# Patient Record
Sex: MALE | Race: WHITE | NOT HISPANIC OR LATINO | Employment: FULL TIME | URBAN - METROPOLITAN AREA
[De-identification: names, ages, dates, MRNs, and addresses within clinical notes are randomized per-mention and may not be internally consistent; named-entity substitution may affect disease eponyms.]

---

## 2018-01-02 ENCOUNTER — OFFICE VISIT (OUTPATIENT)
Dept: UROLOGY | Facility: CLINIC | Age: 43
End: 2018-01-02
Payer: COMMERCIAL

## 2018-01-02 VITALS
WEIGHT: 220 LBS | DIASTOLIC BLOOD PRESSURE: 97 MMHG | HEART RATE: 89 BPM | BODY MASS INDEX: 29.16 KG/M2 | SYSTOLIC BLOOD PRESSURE: 159 MMHG | HEIGHT: 73 IN

## 2018-01-02 DIAGNOSIS — Z30.09 VASECTOMY EVALUATION: ICD-10-CM

## 2018-01-02 PROCEDURE — 99999 PR PBB SHADOW E&M-EST. PATIENT-LVL III: CPT | Mod: PBBFAC,,, | Performed by: UROLOGY

## 2018-01-02 PROCEDURE — 99203 OFFICE O/P NEW LOW 30 MIN: CPT | Mod: S$GLB,,, | Performed by: UROLOGY

## 2018-01-02 RX ORDER — CIPROFLOXACIN 500 MG/1
500 TABLET ORAL 2 TIMES DAILY
Qty: 6 TABLET | Refills: 0 | Status: SHIPPED | OUTPATIENT
Start: 2018-01-02 | End: 2018-01-05

## 2018-01-02 RX ORDER — DIAZEPAM 5 MG/1
5 TABLET ORAL
Qty: 3 TABLET | Refills: 0 | Status: SHIPPED | OUTPATIENT
Start: 2018-01-02 | End: 2018-08-20 | Stop reason: ALTCHOICE

## 2018-01-02 RX ORDER — HYDROCODONE BITARTRATE AND ACETAMINOPHEN 5; 325 MG/1; MG/1
1 TABLET ORAL EVERY 6 HOURS PRN
Qty: 10 TABLET | Refills: 0 | Status: SHIPPED | OUTPATIENT
Start: 2018-01-02 | End: 2018-08-20 | Stop reason: ALTCHOICE

## 2018-01-02 RX ORDER — LIDOCAINE HYDROCHLORIDE 20 MG/ML
10 INJECTION, SOLUTION INFILTRATION; PERINEURAL ONCE
Status: CANCELLED | OUTPATIENT
Start: 2018-01-02 | End: 2018-01-02

## 2018-01-02 NOTE — PROGRESS NOTES
CC: vasectomy evaluation    Dada Rosales is a 42 y.o. man who is here for the evaluation of Sterilization  a new pt referred by his PCP.  A father of 4 children.  2 children from his second marriage.  The youngest one is 6 years old.  His wife is 34 years old.  He works at the [employer].  No voiding problems.  No family hx of prostate cancer.  Denies flank pain, dysuira, hematuria .      Past Medical History:   Diagnosis Date    Hyperlipidemia      Past Surgical History:   Procedure Laterality Date    FRACTURE SURGERY      nose      Social History   Substance Use Topics    Smoking status: Never Smoker    Smokeless tobacco: Not on file    Alcohol use No     History reviewed. No pertinent family history.  Allergy:  Review of patient's allergies indicates:  No Known Allergies  Outpatient Encounter Prescriptions as of 1/2/2018   Medication Sig Dispense Refill    ciprofloxacin HCl (CIPRO) 500 MG tablet Take 1 tablet (500 mg total) by mouth 2 (two) times daily. Take the first dose 2 hours before the procedure 6 tablet 0    diazePAM (VALIUM) 5 MG tablet Take 1 tablet (5 mg total) by mouth as needed for Anxiety. Take all 3 pills 1 hour before the procedure 3 tablet 0    fenofibrate (TRICOR) 54 MG tablet Take 1 tablet (54 mg total) by mouth once daily. 90 tablet 0    hydrocodone-acetaminophen 5-325mg (NORCO) 5-325 mg per tablet Take 1 tablet by mouth every 6 (six) hours as needed for Pain. 10 tablet 0    simvastatin (ZOCOR) 10 MG tablet Take 1 tablet (10 mg total) by mouth every evening. 90 tablet 0     No facility-administered encounter medications on file as of 1/2/2018.      Review of Systems   General ROS: GENERAL:  No weight gain or loss  SKIN:  No rashes or lacerations  HEAD:  No headaches  EYES:  No exophthalmos, jaundice or blindness  EARS:  No dizziness, tinnitus or hearing loss  NOSE:  No changes in smell  MOUTH & THROAT:  No dyskinetic movements or obvious goiter  CHEST:  No shortness of  breath, hyperventilation or cough  CARDIOVASCULAR:  No tachycardia or chest pain  ABDOMEN:  No nausea, vomiting, pain, constipation or diarrhea  URINARY:  No frequency, dysuria or sexual dysfunction  ENDOCRINE:  No polydipsia, polyuria  MUSCULOSKELETAL:  No pain or stiffness of the joints  NEUROLOGIC:  No weakness, sensory changes, seizures, confusion, memory loss, tremor or other abnormal movements  Physical Exam     Vitals:    01/02/18 1409   BP: (!) 159/97   Pulse: 89     Physical Exam  Genitalia:  Scrotum: no rash or lesion  Normal symmetric epididymis without masses  Normal vas palpated  Normal size, symmetric testicles with no masses   Normal urethral meatus with no discharge  Normal circumcised penis with no lesion   Rectal:  Normal perineum and anus upon inspection.  Normal tone, no masses or tenderness;     LABS:  No results found for: PSA, PSADIAG, PSATOTAL, PSAFREE, PSAFREEPCT  No results found for this or any previous visit.  Lab Results   Component Value Date    CREATININE 1.08 08/31/2015     Assessment and Plan:  Dada Hoffmann was seen today for sterilization.    Diagnoses and all orders for this visit:    Vasectomy evaluation  -     Vasectomy; Future    Other orders  -     diazePAM (VALIUM) 5 MG tablet; Take 1 tablet (5 mg total) by mouth as needed for Anxiety. Take all 3 pills 1 hour before the procedure  -     ciprofloxacin HCl (CIPRO) 500 MG tablet; Take 1 tablet (500 mg total) by mouth 2 (two) times daily. Take the first dose 2 hours before the procedure  -     hydrocodone-acetaminophen 5-325mg (NORCO) 5-325 mg per tablet; Take 1 tablet by mouth every 6 (six) hours as needed for Pain.  -     lidocaine HCL 20 mg/ml (2%) injection 10 mL; 10 mLs by Infiltration route once.    The risks and benefits of vasectomy were discussed with the patient in detail.    The risks include, but are not limited to, bleeding, infection, pain, scarring, chronic pain, sperm granuloma, recannulization and loss of testicular  function.    The patient was informed that the long term effects of vasectomy are unknown and could be associated with a higher risk of prostate cancer.     1.  Stop Aspirin or any blood thinners 1 week before the procedure.  2.  Take a shower with antibiotic soap.  3.  Shave as directed.  4.  Bring your scrotal support.  5.  Need a ride back and forth for the procedure.  6.  Take the antibiotics and valium as directed.    Follow-up:  Return for vasectomy.

## 2018-02-08 ENCOUNTER — HOSPITAL ENCOUNTER (OUTPATIENT)
Dept: UROLOGY | Facility: HOSPITAL | Age: 43
Discharge: HOME | End: 2018-02-08
Attending: UROLOGY
Payer: COMMERCIAL

## 2018-02-08 VITALS
HEIGHT: 73 IN | RESPIRATION RATE: 18 BRPM | WEIGHT: 215.19 LBS | SYSTOLIC BLOOD PRESSURE: 138 MMHG | DIASTOLIC BLOOD PRESSURE: 74 MMHG | HEART RATE: 68 BPM | BODY MASS INDEX: 28.52 KG/M2 | TEMPERATURE: 98 F

## 2018-02-08 DIAGNOSIS — Z30.09 VASECTOMY EVALUATION: ICD-10-CM

## 2018-02-08 PROCEDURE — 27200994 HC ELECTROCAUTERY DEVICE

## 2018-02-08 PROCEDURE — 55250 REMOVAL OF SPERM DUCT(S): CPT | Mod: ,,, | Performed by: UROLOGY

## 2018-02-08 PROCEDURE — 55250 REMOVAL OF SPERM DUCT(S): CPT

## 2018-02-08 RX ORDER — LIDOCAINE HYDROCHLORIDE 20 MG/ML
10 INJECTION, SOLUTION INFILTRATION; PERINEURAL ONCE
Status: COMPLETED | OUTPATIENT
Start: 2018-02-08 | End: 2018-02-08

## 2018-02-08 RX ADMIN — LIDOCAINE HYDROCHLORIDE 10 ML: 20 INJECTION, SOLUTION INFILTRATION; PERINEURAL at 11:02

## 2018-02-08 NOTE — PROCEDURES
Procedure Date:  02/08/2018    Procedure:                                                                        Bilateral Vasectomy                                                                                              Pre-OP Diagnosis:                                                                 Patient requests sterilization                                          Post-OP Diagnosis:                                                                Patient requests sterilization                                          Anesthesia:                                                                       Anesthesia Administered:                                                     Local anesthetic infiltration, using 6 mL of 2% lidocaine                    (Xylocaine) without epinephrine injected into the skin /                     subcutaneous tissues (administered by surgeon).                         Findings:                                                                         Vas identified with ease bilaterally.                                   Description of Procedure:                                                         Informed Consent:                                                            - Risks, benefits and alternatives of procedure discussed with               patient and informed consent obtained.                                       Patient Position:                                                            - Supine.                                                                    Prep and Drape:                                                              - Patient prepped and draped in usual sterile fashion using povidone         iodine (Betadine).                                                           Incision(s):                                                                 - Bilateral scrotal.                                                         Instruments:                                                                  - Ring clamp and sharp mosquito.                                             Procedure Details:                                                           - Vas Ends Occluded with: Clips.                                             - Methods of Vas Occlusion: Clips.                                           - Length of Vas Excised: 1.5 cm.                                             Closure:                                                                     - Subcutaneous Tissue: 3-0 Chromic.                                          - Skin: 3-0 Chromic interrupted.                                             Wound Dressing:                                                              - Wound Dressed with: 4 x 4 gauze and scrotal supporter.                     Sponge / Instrument / Needle Counts:                                         - Final counts correct.                                                      Estimated Blood Loss:                                                        - Minimal.                                                                   Pathology Specimen:                                                          - The specimen sent.                                                    Complications:                                                                    No immediate complications.                                             Post-OP Plan:                                                                     Patient was discharged home in a stable condition.         Medications: abx, pain medication given before.         Follow up: SA in 1 month.                                                               Post Vasectomy instructions given.

## 2018-02-08 NOTE — PATIENT INSTRUCTIONS
What to Expect After a Vasectomy  You cannot drive or operate heavy machinery on the day of the procedure.    Apply ice packs to the scrotal area for 24-48 hours. Avoid direct contact of the ice pack with the skin. Scrotal supports, jock straps, or fitted underwear help elevate the scrotum and reduce discomfort.    You may shower the next day. Gently apply soapy water to the scrotum to wash. Rinse and dry yourself by blotting the skin, not rubbing.    Avoid strenuous physical exercises or sexual relations for at least one week after a vasectomy.    Continue to use birth control for at least 6 weeks or 10-20 ejaculations. You are still considered fertile until your urologist examines a post-vasectomy semen analysis at 6 weeks and perhaps one at 8 weeks as well. Drop off the specimen at the 4th floor [facility] between 8am and 4pm.    Do NOT resume unprotected sexual activity until your physician finds no sperm in your semen.    All stitches will dissolve on their own in 1-2 weeks.    Signs and Symptoms to Report  · A large amount of bleeding at the site  · An unusual amount of pain  · A large amount of swelling in the scrotum  · Fever and chills  · Any signs of infection, such as redness at the site or foul-smelling drainage    Risks  The risks of complication after vasectomy are very low. A few of the risks include:  · Bleeding  · Infection  · Scrotal hematoma - a collection of blood in the scrotum  · Inflammation of the epididymis - inflammation of a structure next to the testicle that helps in maturation of sperm  · Sperm granuloma - a collection of sperm that leaks out from the vas deferens, forming a small nodule or lump. This does not usually cause any discomfort but you may feel it in the scrotum.  · Recanalization - the restoration of the lumen or transport tube between the two ends of the vas deferens, possibly causing fertility    If you have any questions or concerns, please call your [facility]  urologist at [phone].

## 2018-02-08 NOTE — H&P
CC: vasectomy evaluation     Dada Rosales is a 42 y.o. man who is here for the evaluation of Sterilization  a new pt referred by his PCP.  A father of 4 children.  2 children from his second marriage.  The youngest one is 6 years old.  His wife is 34 years old.  He works at the [employer].  No voiding problems.  No family hx of prostate cancer.  Denies flank pain, dysuira, hematuria .            Past Medical History:   Diagnosis Date    Hyperlipidemia              Past Surgical History:   Procedure Laterality Date    FRACTURE SURGERY         nose            Social History   Substance Use Topics    Smoking status: Never Smoker    Smokeless tobacco: Not on file    Alcohol use No      History reviewed. No pertinent family history.  Allergy:  Review of patient's allergies indicates:  No Known Allergies  Encounter Medications   Outpatient Encounter Prescriptions as of 1/2/2018   Medication Sig Dispense Refill    ciprofloxacin HCl (CIPRO) 500 MG tablet Take 1 tablet (500 mg total) by mouth 2 (two) times daily. Take the first dose 2 hours before the procedure 6 tablet 0    diazePAM (VALIUM) 5 MG tablet Take 1 tablet (5 mg total) by mouth as needed for Anxiety. Take all 3 pills 1 hour before the procedure 3 tablet 0    fenofibrate (TRICOR) 54 MG tablet Take 1 tablet (54 mg total) by mouth once daily. 90 tablet 0    hydrocodone-acetaminophen 5-325mg (NORCO) 5-325 mg per tablet Take 1 tablet by mouth every 6 (six) hours as needed for Pain. 10 tablet 0    simvastatin (ZOCOR) 10 MG tablet Take 1 tablet (10 mg total) by mouth every evening. 90 tablet 0      No facility-administered encounter medications on file as of 1/2/2018.          Review of Systems   General ROS: GENERAL:  No weight gain or loss  SKIN:  No rashes or lacerations  HEAD:  No headaches  EYES:  No exophthalmos, jaundice or blindness  EARS:  No dizziness, tinnitus or hearing loss  NOSE:  No changes in smell  MOUTH & THROAT:  No dyskinetic  movements or obvious goiter  CHEST:  No shortness of breath, hyperventilation or cough  CARDIOVASCULAR:  No tachycardia or chest pain  ABDOMEN:  No nausea, vomiting, pain, constipation or diarrhea  URINARY:  No frequency, dysuria or sexual dysfunction  ENDOCRINE:  No polydipsia, polyuria  MUSCULOSKELETAL:  No pain or stiffness of the joints  NEUROLOGIC:  No weakness, sensory changes, seizures, confusion, memory loss, tremor or other abnormal movements  Physical Exam         Vitals:     01/02/18 1409   BP: (!) 159/97   Pulse: 89      Physical Exam  Genitalia:  Scrotum: no rash or lesion  Normal symmetric epididymis without masses  Normal vas palpated  Normal size, symmetric testicles with no masses   Normal urethral meatus with no discharge  Normal circumcised penis with no lesion   Rectal:  Normal perineum and anus upon inspection.  Normal tone, no masses or tenderness;      LABS:  No results found for: PSA, PSADIAG, PSATOTAL, PSAFREE, PSAFREEPCT  No results found for this or any previous visit.        Lab Results   Component Value Date     CREATININE 1.08 08/31/2015      Assessment and Plan:  Dada Hoffmann was seen today for sterilization.     Diagnoses and all orders for this visit:     Vasectomy evaluation  -     Vasectomy; Future     Other orders  -     diazePAM (VALIUM) 5 MG tablet; Take 1 tablet (5 mg total) by mouth as needed for Anxiety. Take all 3 pills 1 hour before the procedure  -     ciprofloxacin HCl (CIPRO) 500 MG tablet; Take 1 tablet (500 mg total) by mouth 2 (two) times daily. Take the first dose 2 hours before the procedure  -     hydrocodone-acetaminophen 5-325mg (NORCO) 5-325 mg per tablet; Take 1 tablet by mouth every 6 (six) hours as needed for Pain.  -     lidocaine HCL 20 mg/ml (2%) injection 10 mL; 10 mLs by Infiltration route once.     The risks and benefits of vasectomy were discussed with the patient in detail.    The risks include, but are not limited to, bleeding, infection, pain, scarring,  chronic pain, sperm granuloma, recannulization and loss of testicular function.    The patient was informed that the long term effects of vasectomy are unknown and could be associated with a higher risk of prostate cancer.      1.  Stop Aspirin or any blood thinners 1 week before the procedure.  2.  Take a shower with antibiotic soap.  3.  Shave as directed.  4.  Bring your scrotal support.  5.  Need a ride back and forth for the procedure.  6.  Take the antibiotics and valium as directed.     Follow-up:  Return for vasectomy.

## 2018-03-09 ENCOUNTER — TELEPHONE (OUTPATIENT)
Dept: UROLOGY | Facility: CLINIC | Age: 43
End: 2018-03-09

## 2018-03-09 NOTE — TELEPHONE ENCOUNTER
Notified pt regarding semen specimen dropped of to clinic. Multiple dead sperm seen in specimen per Afsaneh Lynn NP. Pt instructed to have multiple ejaculations before dropping off second sample in two weeks and to continue alternate birth control methods until second sample cleared. Verbalized understanding

## 2018-04-20 ENCOUNTER — TELEPHONE (OUTPATIENT)
Dept: UROLOGY | Facility: CLINIC | Age: 43
End: 2018-04-20

## 2018-04-20 NOTE — TELEPHONE ENCOUNTER
Informed that only 2 dead sperm noted after viewing multiple HP microscopic fields.  Needs to continue alternate BC until analysis noted to be clear.  We discussed only way to clear is to have multiple ejaculations.  Bring sample in 2 weeks.

## 2018-08-20 ENCOUNTER — OFFICE VISIT (OUTPATIENT)
Dept: PRIMARY CARE CLINIC | Facility: CLINIC | Age: 43
End: 2018-08-20
Payer: COMMERCIAL

## 2018-08-20 VITALS
WEIGHT: 211 LBS | BODY MASS INDEX: 27.96 KG/M2 | OXYGEN SATURATION: 98 % | RESPIRATION RATE: 18 BRPM | HEART RATE: 81 BPM | DIASTOLIC BLOOD PRESSURE: 78 MMHG | HEIGHT: 73 IN | SYSTOLIC BLOOD PRESSURE: 131 MMHG | TEMPERATURE: 98 F

## 2018-08-20 DIAGNOSIS — N52.9 ERECTILE DYSFUNCTION, UNSPECIFIED ERECTILE DYSFUNCTION TYPE: ICD-10-CM

## 2018-08-20 DIAGNOSIS — K42.9 UMBILICAL HERNIA WITHOUT OBSTRUCTION AND WITHOUT GANGRENE: Primary | ICD-10-CM

## 2018-08-20 DIAGNOSIS — E78.5 HYPERLIPIDEMIA, UNSPECIFIED HYPERLIPIDEMIA TYPE: ICD-10-CM

## 2018-08-20 PROCEDURE — 3008F BODY MASS INDEX DOCD: CPT | Mod: CPTII,S$GLB,, | Performed by: FAMILY MEDICINE

## 2018-08-20 PROCEDURE — 99213 OFFICE O/P EST LOW 20 MIN: CPT | Mod: S$GLB,,, | Performed by: FAMILY MEDICINE

## 2018-08-20 PROCEDURE — 99999 PR PBB SHADOW E&M-EST. PATIENT-LVL III: CPT | Mod: PBBFAC,,, | Performed by: FAMILY MEDICINE

## 2018-08-20 NOTE — PROGRESS NOTES
Subjective:       Patient ID: Dada Rosales is a 43 y.o. male.    Chief Complaint: Umbilical Hernia    HPI:  43-year-old white male in for umbilical hernia--has had about 1/2 to 2 years--has been gradually increasing in size.  Does increase in size with straining, lifting, and [place].        Patient wife also suggest the patient get a colonoscopy.    ROS:  Skin: no psoriasis, eczema, skin cancer  HEENT: No headache, ocular pain, blurred vision, diplopia, epistaxis, hoarseness change in voice, thyroid trouble  Lung: No pneumonia, asthma, Tb, wheezing, SOB, no smoking   Heart: No chest pain, ankle edema, palpitations, MI, derrick murmur, hypertension,+ hyperlipidemia  Abdomen: No nausea, vomiting, diarrhea, constipation, ulcers, hepatitis, gallbladder disease, melena, hematochezia, hematemesis +see umbilical hernia history of present illness  : no UTI, renal disease, stones  MS: no fractures, O/A, lupus, rheumatoid, gout  Neuro: No dizziness, LOC, seizures   No diabetes, no anemia, no anxiety, no depression   , 4 children, worked  at [employer], lives with wife and 2 children    Objective:   Physical Exam:  General: Well nourished, well developed, no acute distress  Skin: No lesions  HEENT: Eyes PERRLA, EOM intact, nose patent, throat non-erythematous   NECK: Supple, no bruits, No JVD, no nodes  Lungs: Clear, no rales, rhonchi, wheezing  Heart: Regular rate and rhythm, no murmurs, gallops, or rubs  Abdomen: flat, bowel sounds positive, no tenderness, or organomegaly  MS: Range of motion and muscle strength intact  Neuro: Alert, CN intact, oriented X 3  Extremities: No cyanosis, clubbing, or edema         Assessment:       1. Umbilical hernia without obstruction and without gangrene    2. Erectile dysfunction, unspecified erectile dysfunction type    3. Hyperlipidemia, unspecified hyperlipidemia type        Plan:       Umbilical hernia without  obstruction and without gangrene    Erectile dysfunction, unspecified erectile dysfunction type    Hyperlipidemia, unspecified hyperlipidemia type      patient needs routine physical CBCs CMP lipids UA chest x-ray EKG is physical  See Dr. ricardo patient wants colonoscopy an umbilical hernia repair  Exercise-decrease weight  I agree 100 half p.o. q.h.s.

## 2018-09-25 PROBLEM — Z12.11 COLON CANCER SCREENING: Status: ACTIVE | Noted: 2018-09-25

## 2020-03-25 ENCOUNTER — OFFICE VISIT (OUTPATIENT)
Dept: PRIMARY CARE CLINIC | Facility: CLINIC | Age: 45
End: 2020-03-25
Payer: COMMERCIAL

## 2020-03-25 ENCOUNTER — TELEPHONE (OUTPATIENT)
Dept: PRIMARY CARE CLINIC | Facility: CLINIC | Age: 45
End: 2020-03-25

## 2020-03-25 DIAGNOSIS — B02.9 HERPES ZOSTER WITHOUT COMPLICATION: ICD-10-CM

## 2020-03-25 PROCEDURE — 99213 PR OFFICE/OUTPT VISIT, EST, LEVL III, 20-29 MIN: ICD-10-PCS | Mod: 95,,, | Performed by: FAMILY MEDICINE

## 2020-03-25 PROCEDURE — 99213 OFFICE O/P EST LOW 20 MIN: CPT | Mod: 95,,, | Performed by: FAMILY MEDICINE

## 2020-03-25 RX ORDER — VALACYCLOVIR HYDROCHLORIDE 1 G/1
TABLET, FILM COATED ORAL
Qty: 21 TABLET | Refills: 0 | Status: SHIPPED | OUTPATIENT
Start: 2020-03-25

## 2020-03-25 RX ORDER — PREDNISONE 10 MG/1
TABLET ORAL
Qty: 20 TABLET | Refills: 0 | Status: SHIPPED | OUTPATIENT
Start: 2020-03-25

## 2020-03-25 RX ORDER — BETAMETHASONE VALERATE 1.2 MG/G
CREAM TOPICAL 2 TIMES DAILY
Qty: 60 G | Refills: 2 | Status: SHIPPED | OUTPATIENT
Start: 2020-03-25

## 2020-03-25 NOTE — PROGRESS NOTES
Subjective:       Patient ID: Dada Rosales is a 44 y.o. male.    Chief Complaint: No chief complaint on file.    HPI: The patient location is:  home  The chief complaint leading to consultation is:  Skin lesion axillary area   Visit type: Virtual visit with synchronous audio and video  Total time spent with patient:  15 min  Each patient to whom he or she provides medical services by telemedicine is:  (1) informed of the relationship between the physician and patient and the respective role of any other health care provider with respect to management of the patient; and (2) notified that he or she may decline to receive medical services by telemedicine and may withdraw from such care at any time.    Notes:  History of present illness 43-year-old white male ---patient complaining of a rash----underneath the left armpit----states that it feels like a brush burn if raise is arm overhead---patient thinks had chickenpox before.  No history of herpes zoster no burning sensation    ROS:  Skin: no psoriasis, eczema, skin cancer --see history of present illness  HEENT: No headache, ocular pain, blurred vision, diplopia, epistaxis, hoarseness change in voice, thyroid trouble  Lung: No pneumonia, asthma, Tb, wheezing, SOB, no smoking   Heart: No chest pain, ankle edema, palpitations, MI, derrick murmur, hypertension,+ hyperlipidemia  Abdomen: No nausea, vomiting, diarrhea, constipation, ulcers, hepatitis, gallbladder disease, melena, hematochezia, hematemesis +see umbilical hernia history of present illness  : no UTI, renal disease, stones  MS: no fractures, O/A, lupus, rheumatoid, gout  Neuro: No dizziness, LOC, seizures   No diabetes, no anemia, no anxiety, no depression   , 4 children, worked  at [employer], lives with wife and 2 children    Objective:   Physical Exam:  General: Well nourished, well developed, no acute distress  Skin: No lesions  HEENT: Eyes PERRLA, EOM intact,  nose patent, throat non-erythematous   NECK: Supple, no bruits, No JVD, no nodes  Lungs: Clear, no rales, rhonchi, wheezing  Heart: Regular rate and rhythm, no murmurs, gallops, or rubs  Abdomen: flat, bowel sounds positive, no tenderness, or organomegaly  MS: Range of motion and muscle strength intact  Neuro: Alert, CN intact, oriented X 3  Extremities: No cyanosis, clubbing, or edema         Assessment:       1. Herpes zoster without complication        Plan:       Herpes zoster without complication  -     CBC auto differential; Future; Expected date: 03/25/2020  -     Comprehensive metabolic panel; Future; Expected date: 03/25/2020  -     EKG 12-lead; Future  -     Fecal Immunochemical Test (iFOBT); Future; Expected date: 03/25/2020  -     Lipid panel; Future; Expected date: 03/25/2020  -     X-Ray Chest PA And Lateral; Future; Expected date: 03/25/2020  -     POCT urine dipstick without microscope  -     T4, free; Future; Expected date: 03/25/2020  -     TSH; Future; Expected date: 03/25/2020    Other orders  -     valACYclovir (VALTREX) 1000 MG tablet; One p.o. t.i.d. x7 days  Dispense: 21 tablet; Refill: 0  -     betamethasone valerate 0.1% (VALISONE) 0.1 % Crea; Apply topically 2 (two) times daily.  Dispense: 60 g; Refill: 2  -     predniSONE (DELTASONE) 10 MG tablet; 4 p.o. q.d. x2 days or 40 mg, 3 p.o. q.d. x2 days or 30 mg, 2 p.o. q.d. x2 days or 20 mg , 1 p.o. q.d. x2 days or 10 mg then DC  Dispense: 20 tablet; Refill: 0        Herpes zoster--left axillary area approximately the 5th and 6th intercostal space in the mid axillary line--patch approximately size of a half dollar--vesicular--patient will be treated with prednisone in a tapering dose 40 mg q.d. x2 days 30 mg q.d. x2 days 20 mg q.d. x2 days 10 mg q.d. x2 days Valtrex 1 g t.i.d. x7 days Valisone cream t.i.d.---patient told that they should avoid immune compromised her pregnant people--not sure if it is contagious a not but the probably early is  it is not patient lives with wife and 2 children  Health maintenance HIV tetanus flu  Last physical 2015 patient should get CBCs CMP lipids T4 TSH stool guaiac UA chest x-ray EKG as physical    Answers for HPI/ROS submitted by the patient on 3/25/2020   Rash  Chronicity: new  Onset: yesterday  Progression since onset: gradually worsening  Affected locations: back  Characteristics: blistering  Exposed to: nothing  anorexia: No  congestion: No  cough: No  diarrhea: No  eye pain: No  facial edema: No  fatigue: No  fever: No  joint pain: No  nail changes: No  rhinorrhea: No  shortness of breath: No  sore throat: No  vomiting: No  Treatments tried: anti-itch cream  Improvement on treatment: no relief  asthma: No  allergies: No  eczema: No  varicella: Yes

## 2020-03-25 NOTE — TELEPHONE ENCOUNTER
----- Message from Lucero Bishop sent at 3/25/2020  2:39 PM CDT -----  Contact: wife/adrian/[phone]  Pt wife called in regards to the pt has the shingles and wanted to know do he have to come into the office or can he get a Rx. She would like a call back.     Please advise

## 2021-04-05 ENCOUNTER — PATIENT MESSAGE (OUTPATIENT)
Dept: ADMINISTRATIVE | Facility: HOSPITAL | Age: 46
End: 2021-04-05

## 2021-04-26 ENCOUNTER — PATIENT MESSAGE (OUTPATIENT)
Dept: RESEARCH | Facility: HOSPITAL | Age: 46
End: 2021-04-26

## 2021-07-06 ENCOUNTER — PATIENT MESSAGE (OUTPATIENT)
Dept: ADMINISTRATIVE | Facility: HOSPITAL | Age: 46
End: 2021-07-06

## 2024-11-07 ENCOUNTER — OFFICE VISIT (OUTPATIENT)
Dept: PRIMARY CARE CLINIC | Facility: CLINIC | Age: 49
End: 2024-11-07
Payer: COMMERCIAL

## 2024-11-07 VITALS
DIASTOLIC BLOOD PRESSURE: 74 MMHG | SYSTOLIC BLOOD PRESSURE: 132 MMHG | HEIGHT: 73 IN | WEIGHT: 230.25 LBS | OXYGEN SATURATION: 98 % | BODY MASS INDEX: 30.52 KG/M2 | HEART RATE: 105 BPM

## 2024-11-07 DIAGNOSIS — Z76.89 ENCOUNTER TO ESTABLISH CARE: Primary | ICD-10-CM

## 2024-11-07 DIAGNOSIS — M72.0 DUPUYTREN'S CONTRACTURE OF LEFT HAND: ICD-10-CM

## 2024-11-07 DIAGNOSIS — Z79.899 OTHER LONG TERM (CURRENT) DRUG THERAPY: ICD-10-CM

## 2024-11-07 DIAGNOSIS — Z87.448 H/O SIMPLE RENAL CYST: ICD-10-CM

## 2024-11-07 DIAGNOSIS — Z00.00 ANNUAL PHYSICAL EXAM: ICD-10-CM

## 2024-11-07 DIAGNOSIS — N40.0 ENLARGED PROSTATE: ICD-10-CM

## 2024-11-07 DIAGNOSIS — D35.02 ADENOMA OF LEFT ADRENAL GLAND: ICD-10-CM

## 2024-11-07 DIAGNOSIS — K76.0 HEPATIC STEATOSIS: ICD-10-CM

## 2024-11-07 PROCEDURE — 99999 PR PBB SHADOW E&M-EST. PATIENT-LVL IV: CPT | Mod: PBBFAC,,, | Performed by: STUDENT IN AN ORGANIZED HEALTH CARE EDUCATION/TRAINING PROGRAM

## 2024-11-07 NOTE — PATIENT INSTRUCTIONS
Assessment & Plan    Reviewed CT results: fatty liver disease, left adrenal adenoma, prostate enlargement, and kidney cysts noted  Considered fatty liver disease as cause of slightly elevated liver function tests  Evaluated umbilical hernia, determined to be cosmetic and non-emergent  Assessed early signs of Dupuytren's contracture in left hand  Recommend cholesterol recheck due to prior borderline results and time elapsed since last test    FATTY LIVER DISEASE:  - Explained fatty liver disease, its potential causes, and reversibility.  - Recommend diet modifications to address fatty liver disease: limit fried foods, processed foods, and fatty foods; increase consumption of natural foods.  - Recommend weight loss efforts to manage fatty liver disease and umbilical hernia.  - Liver function tests ordered.    UMBILICAL HERNIA:  - Discussed umbilical hernia, its potential progression, and surgical options if needed.    DUPUYTREN'S CONTRACTURE:  - Provided information on Dupuytren's contracture, its progression, and treatment options.  - Dada can consider hand stretches for Dupuytren's contracture management.    LABS:  - EKG ordered.  - Cholesterol panel ordered.  - TSH ordered.  - PSA ordered.

## 2024-11-07 NOTE — PROGRESS NOTES
Assessment:       1. Encounter to establish care    2. H/O simple renal cyst    3. Hepatic steatosis    4. Enlarged prostate    5. Adenoma of left adrenal gland    6. Other long term (current) drug therapy    7. Annual physical exam    8. Dupuytren's contracture of left hand           Plan:     Assessment & Plan    Reviewed CT results: fatty liver disease, left adrenal adenoma, prostate enlargement, and kidney cysts noted  Considered fatty liver disease as cause of slightly elevated liver function tests  Evaluated umbilical hernia, determined to be cosmetic and non-emergent  Assessed early signs of Dupuytren's contracture in left hand  Recommend cholesterol recheck due to prior borderline results and time elapsed since last test    FATTY LIVER DISEASE:  - Explained fatty liver disease, its potential causes, and reversibility.  - Recommend diet modifications to address fatty liver disease: limit fried foods, processed foods, and fatty foods; increase consumption of natural foods.  - Recommend weight loss efforts to manage fatty liver disease and umbilical hernia.  - Liver function tests ordered.    UMBILICAL HERNIA:  - Discussed umbilical hernia, its potential progression, and surgical options if needed.    DUPUYTREN'S CONTRACTURE:  - Provided information on Dupuytren's contracture, its progression, and treatment options.  - Dada can consider hand stretches for Dupuytren's contracture management.    LABS:  - EKG ordered.  - Cholesterol panel ordered.  - TSH ordered.  - PSA ordered.             Encounter to establish care  -     Comprehensive Metabolic Panel; Future; Expected date: 11/07/2024  -     Lipid Panel; Future; Expected date: 11/07/2024  -     Hemoglobin A1C; Future; Expected date: 11/07/2024  -     TSH; Future; Expected date: 11/07/2024  -     EKG 12-lead    H/O simple renal cyst  Comments:  2 large cysts left kidney, few small ones on right seen on CT on 9/3/24.    Hepatic steatosis  -     Comprehensive  Metabolic Panel; Future; Expected date: 11/07/2024  -     Lipid Panel; Future; Expected date: 11/07/2024  -     Hemoglobin A1C; Future; Expected date: 11/07/2024  -     TSH; Future; Expected date: 11/07/2024    Enlarged prostate  Comments:  seen on CT on 9/3/24.  Orders:  -     PSA, Screening; Future; Expected date: 11/07/2024    Adenoma of left adrenal gland  Comments:  1.8cm seen on CT on 9/3/24.    Other long term (current) drug therapy  -     Comprehensive Metabolic Panel; Future; Expected date: 11/07/2024  -     Lipid Panel; Future; Expected date: 11/07/2024  -     Hemoglobin A1C; Future; Expected date: 11/07/2024  -     TSH; Future; Expected date: 11/07/2024  -     PSA, Screening; Future; Expected date: 11/07/2024  -     EKG 12-lead    Annual physical exam  -     EKG 12-lead    Dupuytren's contracture of left hand                This note was generated with the assistance of ambient listening technology. Verbal consent was obtained by the patient and accompanying visitor(s) for the recording of patient appointment to facilitate this note. I attest to having reviewed and edited the generated note for accuracy, though some syntax or spelling errors may persist. Please contact the author of this note for any clarification.      Subjective:           Patient ID: Dada Rosales   Age:  49 y.o.  Sex: male     Chief Complaint:   Follow-up (ER f/u, elevated liver count, back problem)      History of Present Illness:    Dada Rosales is a 49 y.o. male who presents today with a chief complaint of Follow-up (ER f/u, elevated liver count, back problem)  .    History of Present Illness    CHIEF COMPLAINT:  Dada presents today for follow-up after an emergency room visit for back pain.    BACK PAIN:  He reports a recent emergency room visit due to severe back pain that had progressively worsened, causing him to collapse at work when bending over. He was diagnosed with a muscular issue. Currently, he reports  "complete resolution of back pain and denies any current symptoms or discomfort.    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    INCIDENTAL FINDINGS ON CT SCAN:  CT revealed fatty liver disease with mild hepatomegaly, a 1.8 cm left adrenal adenoma, multiple simple cysts in the left kidney, small hypodensities in the right kidney too small to characterize, and an enlarged prostate. No kidney stones were visualized.    URINARY SYMPTOMS:  He denies any urinary symptoms such as decreased urine stream, difficulty initiating urination, or post-void dribbling, despite CT findings of an enlarged prostate. A recent urinalysis showed signs of dehydration, including hyaline casts and concentrated urine.    FAMILY HISTORY:  His father is 72 years old and remains active, running 5 miles daily. His mother is described as overweight and inactive with some leg problems. He denies any family history of cancer, heart attacks, or strokes.    PAST MEDICAL HISTORY:  He reports a normal prostate check at age 38 and a history of kidney or bladder stones requiring two surgeries.    SOCIAL HISTORY:  He is a non-smoker with minimal alcohol consumption (approximately one drink per week) and denies recreational drug use.    SURGICAL HISTORY:  His surgical history includes a normal colonoscopy in 2018, nose fracture surgery, fingertip injury repair 25-30 years ago, and a vasectomy around [id number].    OTHER MEDICAL CONCERNS:  He reports a history of being told he has a "cosmetic hernia" but denies knowledge of it being specifically umbilical. He also notes a hand issue affecting one hand with limited range of motion in the affected finger, which he has been attempting to massage, believing it might be a clot. He denies similar symptoms in the other hand.      ROS:  Genitourinary: -difficulty urinating  Musculoskeletal: +joint pain, " "-back pain           Review of Systems   Constitutional:  Negative for appetite change, fatigue, fever and unexpected weight change.   HENT:  Negative for hearing loss and sore throat.    Eyes:  Negative for pain and visual disturbance.   Respiratory:  Negative for cough, shortness of breath and wheezing.    Cardiovascular:  Negative for chest pain, palpitations and leg swelling.   Gastrointestinal:  Positive for abdominal distention. Negative for abdominal pain, blood in stool, diarrhea, nausea and vomiting.   Genitourinary:  Negative for difficulty urinating and frequency.   Musculoskeletal:  Negative for arthralgias.   Neurological:  Negative for weakness, numbness and headaches.   Psychiatric/Behavioral:  Negative for suicidal ideas.            Objective:        Vitals:    11/07/24 1552   BP: 132/74   BP Location: Left arm   Patient Position: Sitting   Pulse: 105   SpO2: 98%   Weight: 104.5 kg (230 lb 4.3 oz)   Height: 6' 1" (1.854 m)       Body mass index is 30.38 kg/m².      Physical Exam  Vitals reviewed.   Constitutional:       General: He is not in acute distress.     Appearance: Normal appearance. He is not ill-appearing.      Comments: As per BMI.   HENT:      Head: Normocephalic and atraumatic.      Right Ear: External ear normal.      Left Ear: External ear normal.      Nose: Nose normal.   Eyes:      Extraocular Movements: Extraocular movements intact.      Conjunctiva/sclera: Conjunctivae normal.   Cardiovascular:      Rate and Rhythm: Tachycardia present.      Pulses: Normal pulses.      Heart sounds: No murmur heard.  Pulmonary:      Effort: Pulmonary effort is normal. No respiratory distress.   Abdominal:      Tenderness: There is no right CVA tenderness or left CVA tenderness.      Hernia: A hernia is present.   Musculoskeletal:         General: No swelling or deformity.      Cervical back: Normal range of motion.      Right lower leg: No edema.      Left lower leg: No edema.   Skin:     Capillary " "Refill: Capillary refill takes less than 2 seconds.      Findings: No lesion.   Neurological:      General: No focal deficit present.      Mental Status: He is alert and oriented to person, place, and time.      Gait: Gait normal.   Psychiatric:         Mood and Affect: Mood normal.         Physical Exam    Cardiovascular: Normal radial pulses bilaterally. Normal capillary refill.  MSK: Hand/Wrist - Left: Dupuytren's contracture in left hand.  Abdomen: Umbilical hernia.               Past Medical History:   Diagnosis Date    History of kidney stones        Lab Results   Component Value Date     09/03/2024    K 3.9 09/03/2024     09/03/2024    CO2 19 (L) 09/03/2024    BUN 16 09/03/2024    CREATININE 1.1 09/03/2024    ANIONGAP 15 09/03/2024     No results found for: "HGBA1C"  No results found for: "BNP", "BNPTRIAGEBLO"    Lab Results   Component Value Date    WBC 8.57 09/03/2024    HGB 15.1 09/03/2024    HCT 43.7 09/03/2024     09/03/2024    GRAN 5.4 09/03/2024    GRAN 62.9 09/03/2024     Lab Results   Component Value Date    CHOL 191 12/10/2015    HDL 48 12/10/2015    LDLCALC 111 12/10/2015    TRIG 162 (H) 12/10/2015        Outpatient Encounter Medications as of 11/7/2024   Medication Sig Dispense Refill    [DISCONTINUED] betamethasone valerate 0.1% (VALISONE) 0.1 % Crea Apply topically 2 (two) times daily. 60 g 2    [DISCONTINUED] predniSONE (DELTASONE) 10 MG tablet 4 p.o. q.d. x2 days or 40 mg, 3 p.o. q.d. x2 days or 30 mg, 2 p.o. q.d. x2 days or 20 mg , 1 p.o. q.d. x2 days or 10 mg then DC 20 tablet 0    [DISCONTINUED] valACYclovir (VALTREX) 1000 MG tablet One p.o. t.i.d. x7 days 21 tablet 0     No facility-administered encounter medications on file as of 11/7/2024.            "

## 2024-11-08 LAB
OHS QRS DURATION: 88 MS
OHS QTC CALCULATION: 430 MS

## 2024-11-25 ENCOUNTER — TELEPHONE (OUTPATIENT)
Dept: PRIMARY CARE CLINIC | Facility: CLINIC | Age: 49
End: 2024-11-25
Payer: COMMERCIAL

## 2024-11-25 NOTE — TELEPHONE ENCOUNTER
----- Message from Williams Wong MD sent at 11/13/2024 10:29 PM CST -----    Cholesterol  is borderline.   Kidney/liver normal.  Thyroid normal.  Blood counts good.   A1c showing not diabetic.   PSA only 1.4.